# Patient Record
Sex: FEMALE | Race: OTHER | HISPANIC OR LATINO | Employment: FULL TIME | URBAN - METROPOLITAN AREA
[De-identification: names, ages, dates, MRNs, and addresses within clinical notes are randomized per-mention and may not be internally consistent; named-entity substitution may affect disease eponyms.]

---

## 2017-02-27 ENCOUNTER — GENERIC CONVERSION - ENCOUNTER (OUTPATIENT)
Dept: OTHER | Facility: OTHER | Age: 27
End: 2017-02-27

## 2017-05-15 ENCOUNTER — GENERIC CONVERSION - ENCOUNTER (OUTPATIENT)
Dept: OTHER | Facility: OTHER | Age: 27
End: 2017-05-15

## 2017-08-03 ENCOUNTER — ALLSCRIPTS OFFICE VISIT (OUTPATIENT)
Dept: OTHER | Facility: OTHER | Age: 27
End: 2017-08-03

## 2017-10-19 ENCOUNTER — GENERIC CONVERSION - ENCOUNTER (OUTPATIENT)
Dept: OTHER | Facility: OTHER | Age: 27
End: 2017-10-19

## 2018-01-13 NOTE — PROGRESS NOTES
Assessment    1  Pap smear for cervical cancer screening (V76 2) (Z12 4)   2  Screen for STD (sexually transmitted disease) (V74 5) (Z11 3)    Plan  Birth control    · MedroxyPROGESTERone Acetate 150 MG/ML Intramuscular Suspension  (Depo-Provera)   Rx By: Dominic Simpson; For: Birth control; Dose of 150 MG; Intramuscular; BRITNEY = N; Administered by: Daniel Bearded: 9/26/2016 9:54:00 AM  Pap smear for cervical cancer screening    · (1) THIN PREP PAP WITH IMAGING; Status:Active; Requested [id number]:[id number];    Perform:[address]; For:Pap smear for cervical cancer screening; Ordered By:Dorys Flannery;  Maturation index required? : No  HPV? : if ASCUS  Pap smear for cervical cancer screening, Screen for STD (sexually transmitted disease)    · (1) CHLAMYDIA/GC AMPLIFIED DNA, PCR; Source:Endocervical; Status:Active; Requested FVY:[id number];    Perform:[address]; For:Pap smear for cervical cancer screening, Screen for STD (sexually transmitted disease); Ordered By:Dominic Flannery;  Screen for STD (sexually transmitted disease)    · (1) HEP B SURFACE ANTIGEN; Status:Active; Requested ZOM:[id number];    Perform:[facility]; Due:38Jqj6095;Ordered; For:Screen for STD (sexually transmitted disease); Ordered By:Dominic Flannery;   · (1) HIV AG/AB COMBO, 4TH GEN; [Do Not Release]; Status:Active; Requested  LWE:[id number];    Perform:[facility]; Due:72Myg9206;Ordered; For:Screen for STD (sexually transmitted disease); Ordered By:Dominic Flannery;   · (1) RPR; Status:Active; Requested [id number]:[id number];    Perform:[facility]; Due:43Mlj3089;Ordered; For:Screen for STD (sexually transmitted disease); Ordered By:Dominic Flannery;    Discussion/Summary  health maintenance visit Currently, she eats a healthy diet and has an adequate exercise regimen   Pap test with reflex HPV testing was done today Testing was done today for chlamydia, gonorrhea and HIV  Breast cancer screening: self breast exam technique was taught  Advice and education were given regarding nutrition, aerobic exercise, weight bearing exercise, reproductive health, contraception and seat belt use  Patient discussion: discussed with the patient  27 yo  , on depo x 4 years, with irregular menses, here for annual GYN exam  No complaints today    1  pap test done today  2  STD testing   3  F/u annual GYN exam       Chief Complaint  Annual      History of Present Illness  GYN HM, Adult Female [facility]: The patient is being seen for a health maintenance evaluation  General Health: She denies vision problems  Immunizations status: not up to date  Lifestyle:  She exercises regularly  She does not use tobacco  She denies alcohol use  She denies drug use  Screening:      Review of Systems  periods are irregular and irregular length of periods  Constitutional: No fever, no chills, feels well, no tiredness, no recent weight gain or loss  ENT: no ear ache, no loss of hearing, no nosebleeds or nasal discharge, no sore throat or hoarseness  Cardiovascular: no complaints of slow or fast heart rate, no chest pain, no palpitations, no leg claudication or lower extremity edema  Respiratory: no complaints of shortness of breath, no wheezing, no dyspnea on exertion, no orthopnea or PND  Breasts: no complaints of breast pain, breast lump or nipple discharge  Gastrointestinal: no complaints of abdominal pain, no constipation, no nausea or diarrhea, no vomiting, no bloody stools  Genitourinary: no complaints of dysuria, no incontinence, no pelvic pain, no dysmenorrhea, no vaginal discharge or abnormal vaginal bleeding  Musculoskeletal: no complaints of arthralgia, no myalgia, no joint swelling or stiffness, no limb pain or swelling     Integumentary: no complaints of skin rash or lesion, no itching or dry skin, no skin wounds  Neurological: no complaints of headache, no confusion, no numbness or tingling, no dizziness or fainting  OB History  Pregnancy History (Brief):   Prior pregnancies: : 2  Para: 2 (full-term)   Delivery type: 2 vaginal       Active Problems    1  Birth control (V25 9) (Z30 9)   2  Classic migraine with aura (346 00) (G43 109)   3  Common migraine without aura (346 10) (G43 009)   4  Contraception management (V25 9) (Z30 9)   5  Depot contraception (V25 49) (Z30 40)   6  Vitamin D deficiency (268 9) (E55 9)    Past Medical History    · History of Candidiasis, vagina (112 1) (B37 3)   · History of Encounter for routine gynecological examination (V72 31) (Z01 419)   · History of Previous Pregnancy - Vaginal Delivery   · History of Screen for STD (sexually transmitted disease) (V74 5) (Z11 3)    Surgical History    · Contraception management (V25 9) (Z30 9)    Family History  Mother    · No pertinent family history  Father    · No pertinent family history    Social History    · Denied: History of Alcohol Use (History)   · Depot contraception (V25 49) (Z30 40)   · Denied: History of Drug Use   · Never A Smoker   · Never A Smoker    Current Meds   1  Depo-Provera 150 MG/ML Intramuscular Suspension; Therapy: 50UTN7330 to Recorded   2  MedroxyPROGESTERone Acetate 150 MG/ML Intramuscular Suspension; INJECT   EVERY 11 WEEKS AS DIRECTED; Therapy: 46XMF1350 to (Klever Alvarenga)  Requested for: 32SXQ1410 Ordered   3  SUMAtriptan Succinate 25 MG Oral Tablet; Therapy: (Recorded:18Vlu2517) to Recorded    Allergies    1  No Known Drug Allergies    Physical Exam    Constitutional   General appearance: No acute distress, well appearing and well nourished  Neck   Neck: Normal, supple, trachea midline, no masses  Thyroid: Normal, no thyromegaly  Pulmonary   Respiratory effort: No increased work of breathing or signs of respiratory distress      Auscultation of lungs: Clear to auscultation  Cardiovascular   Auscultation of heart: Normal rate and rhythm, normal S1 and S2, no murmurs  Peripheral vascular exam: Normal pulses Throughout  Genitourinary   External genitalia: Normal and no lesions appreciated  Vagina: Normal, no lesions or dryness appreciated  Urethra: Normal     Urethral meatus: Normal     Bladder: Normal, soft, non-tender and no prolapse or masses appreciated  Cervix: Normal, no palpable masses  Examination of the cervix revealed normal findings and no discharge  Uterus: Normal, non-tender, not enlarged, and no palpable masses  The uterus was normal    Adnexa/parametria: Normal, non-tender and no fullness or masses appreciated  Adnexa Findings: both adenexa were normal and there were no adnexal masses  Chest   Breasts: Normal and no dimpling or skin changes noted  normal appearance  Examination of the nipples revealed normal appearance and no discharge  Right Breast:  No masses palpated  Left Breast: No masses palpated  Normal axillary node palpated on the right  Normal axillary node palpated on the left  Abdomen   Abdomen: Normal, non-tender, and no organomegaly noted  Liver and spleen: No hepatomegaly or splenomegaly  Examination for hernias: No hernias appreciated  Lymphatic   Palpation of lymph nodes in neck, axillae, groin and/or other locations: No lymphadenopathy or masses noted  Skin   Skin and subcutaneous tissue: Normal skin turgor and no rashes  Palpation of skin and subcutaneous tissue: Normal     Psychiatric   Orientation to person, place, and time: Normal     Mood and affect: Normal        Future Appointments    Date/Time Provider Specialty Site   12/12/2016 08:15 AM Joanna [address], Injection Schedule  [facility]     Signatures   Electronically signed by :  NICO Sandoval ; Sep 27 2016  7:31PM EST                       (Author)

## 2018-01-14 NOTE — PROGRESS NOTES
Chief Complaint  Patient here for Depo injection today  Active Problems    1  Birth control (V25 9) (Z30 9)   2  Classic migraine with aura (346 00) (G43 109)   3  Common migraine without aura (346 10) (G43 009)   4  Contraception management (V25 9) (Z30 9)   5  Depot contraception (V25 49) (Z30 40)   6  Pap smear for cervical cancer screening (V76 2) (Z12 4)   7  Screen for STD (sexually transmitted disease) (V74 5) (Z11 3)   8  Vitamin D deficiency (268 9) (E55 9)    Current Meds   1  Depo-Provera 150 MG/ML Intramuscular Suspension; Therapy: 40HNK9764 to Recorded   2  Lexapro TABS; Therapy: (Mirtha Montilla) to Recorded   3  MedroxyPROGESTERone Acetate 150 MG/ML Intramuscular Suspension; INJECT   EVERY 11 WEEKS AS DIRECTED; Therapy: 27FMN4054 to (Last Gerhard Patten)  Requested for: 74FYS0105 Ordered   4  MedroxyPROGESTERone Acetate 150 MG/ML Intramuscular Suspension; INJECT   INTRAMUSCULARLY AS DIRECTED; Therapy: 61LBI4068 to (Last Rx:80Ecq3115)  Requested for: 52YSU9940   Ordered   5  MedroxyPROGESTERone Acetate 150 MG/ML Intramuscular Suspension; INJECT   INTRAMUSCULARLY AS DIRECTED; Therapy: 39SGK9476 to (Last Rx:41Rhm4542)  Requested for: 80Jaa1169   Ordered   6  SUMAtriptan Succinate 25 MG Oral Tablet; Therapy: (Recorded:98Gaf2798) to Recorded    Allergies    1   No Known Drug Allergies    Vitals  Signs    Systolic: 810  Diastolic: 85  Weight: 630 lb     Plan  Birth control    · MedroxyPROGESTERone Acetate 150 MG/ML Intramuscular Suspension    Future Appointments    Date/Time Provider Specialty Site   10/19/2017 08:15 AM [facility], Injection Schedule  [facility]     Signatures   Electronically signed by : Steven Lamb, ; Aug  3 2017  8:20AM EST                       (Author)    Electronically signed by : Patrcia Heimlich, MD; Aug  3 2017  9:20AM EST                       (Administrative)

## 2018-01-15 ENCOUNTER — ALLSCRIPTS OFFICE VISIT (OUTPATIENT)
Dept: OTHER | Facility: OTHER | Age: 28
End: 2018-01-15

## 2018-01-17 NOTE — PROGRESS NOTES
Chief Complaint  Patient is here for Depo  History of Present Illness  HPI: would like to continue Depo for birth control      Active Problems    1  Birth control (V25 9) (Z30 9)   2  Common migraine without aura (346 10) (G43 009)   3  Contraception management (V25 9) (Z30 9)   4  Vitamin D deficiency (268 9) (E55 9)    Current Meds   1  Depo-Provera 150 MG/ML Intramuscular Suspension; Therapy: 44Nui9359 to Recorded    Allergies    1  No Known Drug Allergies    Vitals  Signs [Data Includes: Current Encounter]    Systolic: 419  Diastolic: 75  Weight: 326 lb     Assessment    1  Depot contraception (V25 49) (Z30 40)    Plan  PMH: Screen for STD (sexually transmitted disease)    · Follow-up visit in 3 months Evaluation and Treatment  Follow-up  annual exam and next Depo  Status: Hold For - Scheduling  Requested for: 13DXN6003  SocHx: Depot contraception    · MedroxyPROGESTERone Acetate 150 MG/ML Intramuscular Suspension  (Depo-Provera)    Discussion/Summary    Taking daily calcium  Minimal vaginal bleeding on Depo  Pt verbalized understanding of all discussed  Future Appointments    Date/Time Provider Specialty Site   09/23/2016 08:30 AM [facility] HERIBERTO Greco Schedule  [facility]     Signatures   Electronically signed by :  HERIBERTO Majano; Jul 8 2016  8:56AM EST                       (Author)    Electronically signed by : Drea Krishnan MD; Jul 14 2016 10:32AM EST

## 2018-01-22 VITALS — SYSTOLIC BLOOD PRESSURE: 122 MMHG | WEIGHT: 102 LBS | BODY MASS INDEX: 18.66 KG/M2 | DIASTOLIC BLOOD PRESSURE: 79 MMHG

## 2018-01-22 VITALS — DIASTOLIC BLOOD PRESSURE: 76 MMHG | BODY MASS INDEX: 19.57 KG/M2 | WEIGHT: 107 LBS | SYSTOLIC BLOOD PRESSURE: 110 MMHG

## 2018-01-22 VITALS
HEART RATE: 78 BPM | WEIGHT: 108.4 LBS | BODY MASS INDEX: 19.83 KG/M2 | SYSTOLIC BLOOD PRESSURE: 108 MMHG | DIASTOLIC BLOOD PRESSURE: 76 MMHG

## 2018-01-22 VITALS — BODY MASS INDEX: 19.02 KG/M2 | SYSTOLIC BLOOD PRESSURE: 120 MMHG | DIASTOLIC BLOOD PRESSURE: 85 MMHG | WEIGHT: 104 LBS

## 2018-01-22 VITALS — SYSTOLIC BLOOD PRESSURE: 108 MMHG | DIASTOLIC BLOOD PRESSURE: 72 MMHG | WEIGHT: 104 LBS | BODY MASS INDEX: 19.02 KG/M2

## 2018-03-30 DIAGNOSIS — Z30.42 ENCOUNTER FOR SURVEILLANCE OF INJECTABLE CONTRACEPTIVE: Primary | ICD-10-CM

## 2018-03-30 RX ORDER — MEDROXYPROGESTERONE ACETATE 150 MG/ML
150 INJECTION, SUSPENSION INTRAMUSCULAR
Qty: 1 ML | Refills: 5 | Status: SHIPPED | OUTPATIENT
Start: 2018-03-30

## 2018-04-03 ENCOUNTER — OFFICE VISIT (OUTPATIENT)
Dept: OBGYN CLINIC | Facility: CLINIC | Age: 28
End: 2018-04-03
Payer: COMMERCIAL

## 2018-04-03 VITALS — WEIGHT: 108 LBS | SYSTOLIC BLOOD PRESSURE: 119 MMHG | DIASTOLIC BLOOD PRESSURE: 79 MMHG | BODY MASS INDEX: 19.75 KG/M2

## 2018-04-03 DIAGNOSIS — Z01.419 ENCOUNTER FOR ANNUAL ROUTINE GYNECOLOGICAL EXAMINATION: Primary | ICD-10-CM

## 2018-04-03 DIAGNOSIS — Z30.42 ENCOUNTER FOR SURVEILLANCE OF INJECTABLE CONTRACEPTIVE: ICD-10-CM

## 2018-04-03 PROCEDURE — 99395 PREV VISIT EST AGE 18-39: CPT | Performed by: NURSE PRACTITIONER

## 2018-04-03 PROCEDURE — 96372 THER/PROPH/DIAG INJ SC/IM: CPT | Performed by: NURSE PRACTITIONER

## 2018-04-03 PROCEDURE — G0145 SCR C/V CYTO,THINLAYER,RESCR: HCPCS | Performed by: NURSE PRACTITIONER

## 2018-04-03 RX ORDER — MEDROXYPROGESTERONE ACETATE 150 MG/ML
150 INJECTION, SUSPENSION INTRAMUSCULAR ONCE
Status: COMPLETED | OUTPATIENT
Start: 2018-04-03 | End: 2018-04-03

## 2018-04-03 RX ORDER — MEDROXYPROGESTERONE ACETATE 150 MG/ML
150 INJECTION, SUSPENSION INTRAMUSCULAR ONCE
Status: COMPLETED | OUTPATIENT
Start: 2018-04-03 | End: 2018-06-26

## 2018-04-03 RX ADMIN — MEDROXYPROGESTERONE ACETATE 150 MG: 150 INJECTION, SUSPENSION INTRAMUSCULAR at 08:50

## 2018-04-03 NOTE — PATIENT INSTRUCTIONS
Return for next Depoprovera  Continue daily cacium and vitamin D as directed  Call with needs or concerns  PAP results will be available in 2 weeks

## 2018-04-03 NOTE — PROGRESS NOTES
Assessment     Annual GYN exam  PAP    Contraceptive Management  Continue [address] [facility]      All questions answered  Breast self exam technique reviewed and patient encouraged to perform self-exam monthly  Discussed healthy lifestyle modifications  Follow up in 1 year  Thin prep Pap smear  Return for next Depoprovera  Continue daily cacium and vitamin D as directed  Call with needs or concerns  PAP results will be available in 2 weeks  Pt verbalized understanding of all discussed  Jairon William is a 33 y o  female who presents for annual exam  Periods are none opn DEpo, lasting 0 days  Dysmenorrhea:none  Cyclic symptoms include none  No intermenstrual bleeding, spotting, or discharge  The patient reports that there is not domestic violence in her life  1 partner x 10 yrs  Unsure of last PAP, states all have been WNL  Current contraception: Depo-Provera injections  History of abnormal Pap smear: no  Family history of uterine or ovarian cancer: no  Regular self breast exam: yes  History of abnormal mammogram: N/A  Family history of breast cancer: no  History of abnormal lipids: unknown  Menstrual History:  OB History      Para Term  AB Living    2 2 2     2    SAB TAB Ectopic Multiple Live Births            2         Menarche age: 16  No LMP recorded  Patient has had an injection  Period Duration (Days): no bleeding on depo  Period Pattern: (!) Irregular (depo)  Menstrual Flow: Light  Menstrual Control: Panty liner    The following portions of the patient's history were reviewed and updated as appropriate: allergies, current medications, past family history, past medical history, past social history, past surgical history and problem list     Review of Systems  Pertinent items are noted in HPI        Objective      /79   Wt 49 kg (108 lb)   BMI 19 75 kg/m²     General:   alert and oriented, in no acute distress, alert, appears stated age and cooperative   Heart: regular rate and rhythm, S1, S2 normal, no murmur, click, rub or gallop   Lungs: clear to auscultation bilaterally   Thyroid negative masses   Abdomen: soft, non-tender, without masses or organomegaly   Vulva: normal   Vagina: normal mucosa   Cervix: no bleeding following Pap, no cervical motion tenderness and no lesions   Uterus: normal size, normal shape and consistency   Adnexa: normal adnexa   Breast NT, negative masses, discharge or dimpling           This is a patient instruction: Obtain collection container from provider's office or a Laboratory  Patient needs to void at 8 AM and discard the specimen  Collect all urine for 24 hours including the final specimen voided at the end of the 24-hour collection period (ie, 8 AM the next morning)  Patient to avoid bananas, avocados, plantain, pineapples, plums, eggplant, tomatoes, walnuts, salicylates, acetaminophen, phenacetin, cough syrup containing glyceryl gualacolate, mephenesin, methocarbamol, imipramine, isoniazid, mao inhibitors, methenamine, methyldopa and phenothiazide for 72 hours (or longer) prior to test and during test collection

## 2018-04-06 LAB
LAB AP GYN PRIMARY INTERPRETATION: NORMAL
Lab: NORMAL

## 2018-06-26 ENCOUNTER — CLINICAL SUPPORT (OUTPATIENT)
Dept: OBGYN CLINIC | Facility: CLINIC | Age: 28
End: 2018-06-26
Payer: COMMERCIAL

## 2018-06-26 DIAGNOSIS — Z30.42 ENCOUNTER FOR DEPO-PROVERA CONTRACEPTION: Primary | ICD-10-CM

## 2018-06-26 PROCEDURE — 96372 THER/PROPH/DIAG INJ SC/IM: CPT

## 2018-06-26 RX ADMIN — MEDROXYPROGESTERONE ACETATE 150 MG: 150 INJECTION, SUSPENSION INTRAMUSCULAR at 08:24

## 2018-09-11 ENCOUNTER — CLINICAL SUPPORT (OUTPATIENT)
Dept: OBGYN CLINIC | Facility: CLINIC | Age: 28
End: 2018-09-11

## 2018-09-11 DIAGNOSIS — Z30.42 ENCOUNTER FOR SURVEILLANCE OF INJECTABLE CONTRACEPTIVE: Primary | ICD-10-CM

## 2018-09-11 RX ORDER — MEDROXYPROGESTERONE ACETATE 150 MG/ML
150 INJECTION, SUSPENSION INTRAMUSCULAR ONCE
Status: COMPLETED | OUTPATIENT
Start: 2018-09-11 | End: 2018-09-11

## 2018-09-11 RX ADMIN — MEDROXYPROGESTERONE ACETATE 150 MG: 150 INJECTION, SUSPENSION INTRAMUSCULAR at 08:02

## 2018-11-27 ENCOUNTER — CLINICAL SUPPORT (OUTPATIENT)
Dept: OBGYN CLINIC | Facility: CLINIC | Age: 28
End: 2018-11-27
Payer: COMMERCIAL

## 2018-11-27 DIAGNOSIS — Z30.42 ENCOUNTER FOR SURVEILLANCE OF INJECTABLE CONTRACEPTIVE: Primary | ICD-10-CM

## 2018-11-27 PROCEDURE — 96372 THER/PROPH/DIAG INJ SC/IM: CPT

## 2018-11-27 RX ORDER — MEDROXYPROGESTERONE ACETATE 150 MG/ML
150 INJECTION, SUSPENSION INTRAMUSCULAR ONCE
Status: COMPLETED | OUTPATIENT
Start: 2018-11-27 | End: 2018-11-27

## 2018-11-27 RX ADMIN — MEDROXYPROGESTERONE ACETATE 150 MG: 150 INJECTION, SUSPENSION INTRAMUSCULAR at 14:31

## 2018-11-27 NOTE — PROGRESS NOTES
Depo-Provera      [x]   Patient provided box yes   Vial    Last  Annual/Pap Date:4/3/18    Last Depo date: 9/11/18   Side effects: none   HCG; if applicable: N/A   Given by: Alber Bill Site: left deltoid   Next appt  due: 2/12/18   Calcium supplement daily teaching, condoms for 2 weeks following first injection dose

## 2019-02-12 ENCOUNTER — CLINICAL SUPPORT (OUTPATIENT)
Dept: OBGYN CLINIC | Facility: CLINIC | Age: 29
End: 2019-02-12

## 2019-02-12 DIAGNOSIS — Z30.42 ENCOUNTER FOR SURVEILLANCE OF INJECTABLE CONTRACEPTIVE: Primary | ICD-10-CM

## 2019-02-12 PROCEDURE — 96372 THER/PROPH/DIAG INJ SC/IM: CPT | Performed by: OBSTETRICS & GYNECOLOGY

## 2019-02-12 RX ORDER — MEDROXYPROGESTERONE ACETATE 150 MG/ML
150 INJECTION, SUSPENSION INTRAMUSCULAR ONCE
Status: COMPLETED | OUTPATIENT
Start: 2019-02-12 | End: 2019-02-12

## 2019-02-12 RX ADMIN — MEDROXYPROGESTERONE ACETATE 150 MG: 150 INJECTION, SUSPENSION INTRAMUSCULAR at 08:59

## 2019-02-12 NOTE — PROGRESS NOTES
Depo given in left deltoid  Unsure of continuing depo, will call to make a contraceptive visit or depo injection

## 2019-04-29 DIAGNOSIS — Z30.42 ENCOUNTER FOR SURVEILLANCE OF INJECTABLE CONTRACEPTIVE: Primary | ICD-10-CM

## 2019-04-29 RX ORDER — MEDROXYPROGESTERONE ACETATE 150 MG/ML
150 INJECTION, SUSPENSION INTRAMUSCULAR
Qty: 1 ML | Refills: 5 | Status: SHIPPED | OUTPATIENT
Start: 2019-04-29

## 2019-04-30 ENCOUNTER — CLINICAL SUPPORT (OUTPATIENT)
Dept: OBGYN CLINIC | Facility: CLINIC | Age: 29
End: 2019-04-30

## 2019-04-30 DIAGNOSIS — Z30.42 ENCOUNTER FOR SURVEILLANCE OF INJECTABLE CONTRACEPTIVE: Primary | ICD-10-CM

## 2019-04-30 PROCEDURE — 96372 THER/PROPH/DIAG INJ SC/IM: CPT

## 2019-04-30 RX ORDER — MEDROXYPROGESTERONE ACETATE 150 MG/ML
150 INJECTION, SUSPENSION INTRAMUSCULAR ONCE
Status: COMPLETED | OUTPATIENT
Start: 2019-04-30 | End: 2019-04-30

## 2019-04-30 RX ADMIN — MEDROXYPROGESTERONE ACETATE 150 MG: 150 INJECTION, SUSPENSION INTRAMUSCULAR at 14:53

## 2019-07-19 ENCOUNTER — OFFICE VISIT (OUTPATIENT)
Dept: OBGYN CLINIC | Facility: CLINIC | Age: 29
End: 2019-07-19

## 2019-07-19 DIAGNOSIS — Z30.42 ENCOUNTER FOR DEPO-PROVERA CONTRACEPTION: ICD-10-CM

## 2019-07-19 DIAGNOSIS — Z30.42 ENCOUNTER FOR SURVEILLANCE OF INJECTABLE CONTRACEPTIVE: Primary | ICD-10-CM

## 2019-07-19 PROCEDURE — 96372 THER/PROPH/DIAG INJ SC/IM: CPT

## 2019-07-19 RX ORDER — MEDROXYPROGESTERONE ACETATE 150 MG/ML
150 INJECTION, SUSPENSION INTRAMUSCULAR ONCE
Status: COMPLETED | OUTPATIENT
Start: 2019-07-19 | End: 2019-07-19

## 2019-07-19 RX ADMIN — MEDROXYPROGESTERONE ACETATE 150 MG: 150 INJECTION, SUSPENSION INTRAMUSCULAR at 13:46

## 2019-07-19 NOTE — PROGRESS NOTES
Depo-Provera      Patient provided box   Cheikh Caballero Last Depo date: 4/30/19   Side effects: none   Given by: Cortez Mcintyre MA   Site: left deltoid   Annual apt: 8/7/19   Depo due: 10/4-10/18     Calcium supplement daily teaching, condoms for 2 weeks following first injection dose

## 2019-09-10 ENCOUNTER — OFFICE VISIT (OUTPATIENT)
Dept: OBGYN CLINIC | Facility: CLINIC | Age: 29
End: 2019-09-10

## 2019-09-10 VITALS
SYSTOLIC BLOOD PRESSURE: 131 MMHG | DIASTOLIC BLOOD PRESSURE: 85 MMHG | WEIGHT: 119 LBS | HEART RATE: 88 BPM | BODY MASS INDEX: 21.77 KG/M2

## 2019-09-10 DIAGNOSIS — Z01.419 ENCOUNTER FOR ANNUAL ROUTINE GYNECOLOGICAL EXAMINATION: Primary | ICD-10-CM

## 2019-09-10 PROCEDURE — 99395 PREV VISIT EST AGE 18-39: CPT | Performed by: NURSE PRACTITIONER

## 2019-09-10 RX ORDER — IBUPROFEN 200 MG
TABLET ORAL EVERY 6 HOURS PRN
COMMUNITY

## 2019-09-10 NOTE — PROGRESS NOTES
Annual Exam    Assessment   1  Encounter for annual routine gynecological examination       well woman       Plan       All questions answered  Breast self exam technique reviewed and patient encouraged to perform self-exam monthly  Contraception: Depo-Provera injections  Discussed healthy lifestyle modifications  Follow up in 1 year  Patient Instructions   Return for next Depoprovera  Call with needs or concerns  Return for annual GYN exam in 1 year  Pt verbalized understanding of all discussed  José Miguel Urrutia is a 33 y o  Noelle Kitty female who presents for annual well woman exam  Periods are not occurring with Depo, lasting 1 days  No vaginal discharge  1 partner > 10 years, denies domestic violence  Last WNL PAP 2018     Current contraception: Depo-Provera injections  History of abnormal Pap smear: no  Family history of uterine or ovarian cancer: no  Regular self breast exam: yes  History of abnormal mammogram: N/A  Family history of breast cancer: no  History of abnormal lipids: unknown  Menstrual History:  OB History        2    Para   2    Term   2            AB        Living   2       SAB        TAB        Ectopic        Multiple        Live Births   2                Menarche age: 16  No LMP recorded (lmp unknown)  Patient has had an injection  Depoprovera       The following portions of the patient's history were reviewed and updated as appropriate: allergies, current medications, past family history, past medical history, past social history, past surgical history and problem list     Review of Systems  Pertinent items are noted in HPI        Objective      /85   Pulse 88   Wt 54 kg (119 lb)   LMP  (LMP Unknown)   BMI 21 77 kg/m²     General: alert and oriented, in no acute distress, alert, appears stated age and cooperative   Heart: regular rate and rhythm, S1, S2 normal, no murmur, click, rub or gallop   Lungs: clear to auscultation bilaterally Thyroid: Negative masses   Abdomen: soft, non-tender, without masses or organomegaly   Vulva: normal   Vagina: normal mucosa   Cervix: no cervical motion tenderness and no lesions   Uterus: normal size, non-tender, normal shape and consistency   Adnexa: normal adnexa   Urethra: normal   Breasts: NT,negative masses, discharge, or dimpling

## 2020-11-20 ENCOUNTER — APPOINTMENT (EMERGENCY)
Dept: CT IMAGING | Facility: HOSPITAL | Age: 30
End: 2020-11-20
Payer: COMMERCIAL

## 2020-11-20 ENCOUNTER — HOSPITAL ENCOUNTER (EMERGENCY)
Facility: HOSPITAL | Age: 30
Discharge: HOME | End: 2020-11-20
Attending: EMERGENCY MEDICINE
Payer: COMMERCIAL

## 2020-11-20 VITALS
WEIGHT: 108.91 LBS | HEART RATE: 86 BPM | OXYGEN SATURATION: 98 % | TEMPERATURE: 98.1 F | BODY MASS INDEX: 19.92 KG/M2 | SYSTOLIC BLOOD PRESSURE: 144 MMHG | RESPIRATION RATE: 18 BRPM | DIASTOLIC BLOOD PRESSURE: 98 MMHG

## 2020-11-20 DIAGNOSIS — G43.909 MIGRAINE: Primary | ICD-10-CM

## 2020-11-20 LAB
EXT PREG TEST URINE: NEGATIVE
EXT. CONTROL ED NAV: NORMAL

## 2020-11-20 PROCEDURE — 70450 CT HEAD/BRAIN W/O DYE: CPT

## 2020-11-20 PROCEDURE — 99284 EMERGENCY DEPT VISIT MOD MDM: CPT | Performed by: EMERGENCY MEDICINE

## 2020-11-20 PROCEDURE — G1004 CDSM NDSC: HCPCS

## 2020-11-20 PROCEDURE — 96375 TX/PRO/DX INJ NEW DRUG ADDON: CPT

## 2020-11-20 PROCEDURE — 99284 EMERGENCY DEPT VISIT MOD MDM: CPT

## 2020-11-20 PROCEDURE — 96374 THER/PROPH/DIAG INJ IV PUSH: CPT

## 2020-11-20 PROCEDURE — 81025 URINE PREGNANCY TEST: CPT | Performed by: EMERGENCY MEDICINE

## 2020-11-20 PROCEDURE — 96361 HYDRATE IV INFUSION ADD-ON: CPT

## 2020-11-20 RX ORDER — METOCLOPRAMIDE 10 MG/1
10 TABLET ORAL EVERY 6 HOURS
Qty: 10 TABLET | Refills: 0 | Status: SHIPPED | OUTPATIENT
Start: 2020-11-20

## 2020-11-20 RX ORDER — METOCLOPRAMIDE HYDROCHLORIDE 5 MG/ML
10 INJECTION INTRAMUSCULAR; INTRAVENOUS ONCE
Status: COMPLETED | OUTPATIENT
Start: 2020-11-20 | End: 2020-11-20

## 2020-11-20 RX ORDER — KETOROLAC TROMETHAMINE 30 MG/ML
15 INJECTION, SOLUTION INTRAMUSCULAR; INTRAVENOUS ONCE
Status: COMPLETED | OUTPATIENT
Start: 2020-11-20 | End: 2020-11-20

## 2020-11-20 RX ADMIN — METOCLOPRAMIDE 10 MG: 5 INJECTION, SOLUTION INTRAMUSCULAR; INTRAVENOUS at 10:34

## 2020-11-20 RX ADMIN — KETOROLAC TROMETHAMINE 15 MG: 30 INJECTION, SOLUTION INTRAMUSCULAR at 10:32

## 2020-11-20 RX ADMIN — SODIUM CHLORIDE 1000 ML: 0.9 INJECTION, SOLUTION INTRAVENOUS at 10:32

## 2020-12-22 ENCOUNTER — HOSPITAL ENCOUNTER (EMERGENCY)
Facility: HOSPITAL | Age: 30
Discharge: HOME | End: 2020-12-22
Attending: EMERGENCY MEDICINE
Payer: COMMERCIAL

## 2020-12-22 ENCOUNTER — APPOINTMENT (EMERGENCY)
Dept: RADIOLOGY | Facility: HOSPITAL | Age: 30
End: 2020-12-22
Payer: COMMERCIAL

## 2020-12-22 VITALS
RESPIRATION RATE: 14 BRPM | OXYGEN SATURATION: 99 % | TEMPERATURE: 98.9 F | WEIGHT: 108.03 LBS | HEIGHT: 64 IN | SYSTOLIC BLOOD PRESSURE: 120 MMHG | HEART RATE: 114 BPM | DIASTOLIC BLOOD PRESSURE: 83 MMHG | BODY MASS INDEX: 18.44 KG/M2

## 2020-12-22 DIAGNOSIS — Z20.822 SUSPECTED COVID-19 VIRUS INFECTION: Primary | ICD-10-CM

## 2020-12-22 PROCEDURE — 99284 EMERGENCY DEPT VISIT MOD MDM: CPT

## 2020-12-22 PROCEDURE — 87637 SARSCOV2&INF A&B&RSV AMP PRB: CPT | Performed by: PHYSICIAN ASSISTANT

## 2020-12-22 PROCEDURE — 99284 EMERGENCY DEPT VISIT MOD MDM: CPT | Performed by: PHYSICIAN ASSISTANT

## 2020-12-22 PROCEDURE — 71045 X-RAY EXAM CHEST 1 VIEW: CPT

## 2020-12-22 RX ORDER — MELATONIN
1000 2 TIMES DAILY
Qty: 14 TABLET | Refills: 0 | Status: SHIPPED | OUTPATIENT
Start: 2020-12-22

## 2020-12-22 RX ORDER — MULTIVIT WITH MINERALS/LUTEIN
1000 TABLET ORAL 2 TIMES DAILY
Qty: 14 TABLET | Refills: 0 | Status: SHIPPED | OUTPATIENT
Start: 2020-12-22 | End: 2020-12-29

## 2020-12-22 RX ORDER — BENZONATATE 100 MG/1
100 CAPSULE ORAL 3 TIMES DAILY PRN
Qty: 21 CAPSULE | Refills: 0 | Status: SHIPPED | OUTPATIENT
Start: 2020-12-22

## 2020-12-22 RX ORDER — ZINC SULFATE 50(220)MG
220 CAPSULE ORAL DAILY
Qty: 7 CAPSULE | Refills: 0 | Status: SHIPPED | OUTPATIENT
Start: 2020-12-22

## 2020-12-24 LAB
FLUAV RNA NPH QL NAA+PROBE: NOT DETECTED
FLUBV RNA NPH QL NAA+PROBE: NOT DETECTED
RSV RNA NPH QL NAA+PROBE: NOT DETECTED
SARS-COV-2 RNA NPH QL NAA+PROBE: DETECTED

## 2022-04-01 NOTE — PROGRESS NOTES
Chief Complaint  Patient is here for Depo  Patient will schedule Annual Exam       Active Problems    1  Birth control (V25 9) (Z30 9)   2  Classic migraine with aura (346 00) (G43 109)   3  Common migraine without aura (346 10) (G43 009)   4  Contraception management (V25 9) (Z30 9)   5  Depot contraception (V25 49) (Z30 40)   6  Pap smear for cervical cancer screening (V76 2) (Z12 4)   7  Screen for STD (sexually transmitted disease) (V74 5) (Z11 3)   8  Vitamin D deficiency (268 9) (E55 9)    Current Meds   1  Depo-Provera 150 MG/ML Intramuscular Suspension; Therapy: 28CWT0908 to Recorded   2  Lexapro TABS; Therapy: (Mallory Awe) to Recorded   3  MedroxyPROGESTERone Acetate 150 MG/ML Intramuscular Suspension; INJECT   EVERY 11 WEEKS AS DIRECTED; Therapy: 81YOP3902 to (Last Peggi Scarce)  Requested for: 64BDU1388 Ordered   4  MedroxyPROGESTERone Acetate 150 MG/ML Intramuscular Suspension; INJECT   INTRAMUSCULARLY AS DIRECTED; Therapy: 70QPR3179 to (Last Rx:49Zhg8446)  Requested for: 28WMG3804   Ordered   5  MedroxyPROGESTERone Acetate 150 MG/ML Intramuscular Suspension; INJECT   INTRAMUSCULARLY AS DIRECTED; Therapy: 42KTK6997 to (Last Rx:44Uil5820)  Requested for: 79Imk8153   Ordered   6  SUMAtriptan Succinate 25 MG Oral Tablet; Therapy: (Recorded:33Mck0634) to Recorded    Allergies    1  No Known Drug Allergies    Vitals  Signs    Systolic: 127  Diastolic: 76  Weight: 835 lb     Plan  Birth control    · MedroxyPROGESTERone Acetate 150 MG/ML Intramuscular Suspension    Signatures   Electronically signed by : Jahaira Soliman, ; Graeme 15 2018  8:10AM EST                       (Author)    Electronically signed by :  HERIBERTO Nicole; Graeme 15 2018  4:38PM EST                       (Author)    Electronically signed by : Stephanie Rios MD; Jan 17 2018  3:43PM EST                       (Administrative) R41.0